# Patient Record
Sex: FEMALE | Race: WHITE | URBAN - METROPOLITAN AREA
[De-identification: names, ages, dates, MRNs, and addresses within clinical notes are randomized per-mention and may not be internally consistent; named-entity substitution may affect disease eponyms.]

---

## 2024-09-21 ENCOUNTER — OFFICE VISIT (OUTPATIENT)
Dept: URGENT CARE | Facility: CLINIC | Age: 39
End: 2024-09-21

## 2024-09-21 VITALS
HEART RATE: 77 BPM | RESPIRATION RATE: 16 BRPM | TEMPERATURE: 98.6 F | BODY MASS INDEX: 29.03 KG/M2 | SYSTOLIC BLOOD PRESSURE: 96 MMHG | OXYGEN SATURATION: 97 % | HEIGHT: 67 IN | DIASTOLIC BLOOD PRESSURE: 70 MMHG | WEIGHT: 185 LBS

## 2024-09-21 DIAGNOSIS — H57.89 EYE REDNESS: ICD-10-CM

## 2024-09-21 PROCEDURE — 99203 OFFICE O/P NEW LOW 30 MIN: CPT

## 2024-09-21 PROCEDURE — 3074F SYST BP LT 130 MM HG: CPT

## 2024-09-21 PROCEDURE — 3078F DIAST BP <80 MM HG: CPT

## 2024-09-22 ASSESSMENT — ENCOUNTER SYMPTOMS
EYE REDNESS: 1
BLURRED VISION: 0
EYE DISCHARGE: 0
EYE PAIN: 1
DOUBLE VISION: 0
PHOTOPHOBIA: 0
FEVER: 0

## 2024-09-22 NOTE — PROGRESS NOTES
"Verbal consent was acquired by the patient to use [employer] listening note generation during this visit   Subjective:   Prema Richard is a 39 y.o. female who presents for Eye Problem (Rt redness & sensitive, headache x days)      HPI:  History of Present Illness  The patient is a 39-year-old female who presents for evaluation of a right eye redness.     She began experiencing a mild headache this afternoon, initially attributing it to sinus issues. Approximately 2.5 to 3 hours later, she observed redness in her eye while looking in the mirror. This was accompanied by intermittent pain and light sensitivity, although she reports no severe headaches.     She reports no changes in vision. Additionally, she does not report any itching, burning, or discharge from the eye. The discomfort is intermittent, with periods of relief followed by renewed light sensitivity and pain. She has not been ill recently and does not wear contact lenses.          Review of Systems   Constitutional:  Negative for fever.   Eyes:  Positive for pain and redness. Negative for blurred vision, double vision, photophobia and discharge.       Medications:    No current outpatient medications on file prior to visit.     No current facility-administered medications on file prior to visit.        Allergies:   Patient has no allergy information on record.    Problem List:   There is no problem list on file for this patient.       Surgical History:  No past surgical history on file.    Past Social Hx:   Social History     Tobacco Use    Smoking status: Never    Smokeless tobacco: Never   Substance Use Topics    Drug use: Never          Problem list, medications, and allergies reviewed by myself today in Epic.     Objective:     BP 96/70   Pulse 77   Temp 37 °C (98.6 °F) (Temporal)   Resp 16   Ht 1.702 m (5' 7\")   Wt 83.9 kg (185 lb)   SpO2 97%   BMI 28.98 kg/m²     Physical Exam  Vitals and nursing note reviewed.   Constitutional:  "      General: She is not in acute distress.     Appearance: Normal appearance. She is normal weight. She is not ill-appearing, toxic-appearing or diaphoretic.   HENT:      Head: Normocephalic and atraumatic.   Eyes:      General:         Right eye: No discharge.         Left eye: No discharge.      Extraocular Movements: Extraocular movements intact.      Conjunctiva/sclera:      Right eye: Right conjunctiva is injected.      Pupils: Pupils are equal, round, and reactive to light.   Cardiovascular:      Rate and Rhythm: Normal rate and regular rhythm.      Pulses: Normal pulses.      Heart sounds: Normal heart sounds. No murmur heard.     No friction rub. No gallop.   Pulmonary:      Effort: Pulmonary effort is normal. No respiratory distress.      Breath sounds: Normal breath sounds. No stridor. No wheezing, rhonchi or rales.   Chest:      Chest wall: No tenderness.   Musculoskeletal:      Cervical back: Neck supple. No tenderness.   Lymphadenopathy:      Cervical: No cervical adenopathy.   Skin:     General: Skin is warm and dry.      Capillary Refill: Capillary refill takes less than 2 seconds.   Neurological:      General: No focal deficit present.      Mental Status: She is alert and oriented to person, place, and time. Mental status is at baseline.      Cranial Nerves: No cranial nerve deficit.      Motor: No weakness.      Gait: Gait normal.   Psychiatric:         Mood and Affect: Mood normal.         Behavior: Behavior normal.         Thought Content: Thought content normal.         Judgment: Judgment normal.         Assessment/Plan:     Diagnosis and associated orders:      1. Eye redness            Comments/MDM:   Pt is clinically stable at today's acute urgent care visit.  No acute distress noted. Appropriate for outpatient management at this time.     Assessment & Plan  Eye redness  The eye appears irritated without signs of hemorrhage, discharge, itching, or burning. She was advised to monitor the  symptoms. If she develops discharge from the eye, she should contact the doctor, who will then prescribe an antibiotic to avoid an urgent care visit. She has experienced mild headaches. Her blood pressure is within the normal range.Her neurological status is intact. She was advised to take Tylenol for pain management, ensure adequate rest and hydration, and monitor her symptoms. Should she experience severe headaches, vision changes, or floaters, she should seek immediate medical attention at the ER.                    Discussed DDx, management options (risks,benefits, and alternatives to planned treatment), natural progression and supportive care.  Expressed understanding and the treatment plan was agreed upon. Questions were encouraged and answered   Return to urgent care prn if new or worsening sx or if there is no improvement in condition prn.    Educated in Red flags and indications to immediately call 911 or present to the Emergency Department.   Advised the patient to follow-up with the primary care physician for recheck, reevaluation, and consideration of further management.    I personally reviewed prior external notes and test results pertinent to today's visit.  I have independently reviewed and interpreted all diagnostics ordered during this urgent care acute visit.       Please note that this dictation was created using voice recognition software. I have made a reasonable attempt to correct obvious errors, but I expect that there are errors of grammar and possibly content that I did not discover before finalizing the note.    This note was electronically signed by MILAD Reece